# Patient Record
Sex: MALE | Race: WHITE | NOT HISPANIC OR LATINO | Employment: FULL TIME | URBAN - METROPOLITAN AREA
[De-identification: names, ages, dates, MRNs, and addresses within clinical notes are randomized per-mention and may not be internally consistent; named-entity substitution may affect disease eponyms.]

---

## 2017-01-20 ENCOUNTER — APPOINTMENT (OUTPATIENT)
Dept: OCCUPATIONAL MEDICINE | Facility: CLINIC | Age: 57
End: 2017-01-20
Payer: OTHER MISCELLANEOUS

## 2017-01-20 PROCEDURE — 99213 OFFICE O/P EST LOW 20 MIN: CPT

## 2017-01-24 ENCOUNTER — APPOINTMENT (OUTPATIENT)
Dept: OCCUPATIONAL MEDICINE | Facility: CLINIC | Age: 57
End: 2017-01-24
Payer: OTHER MISCELLANEOUS

## 2017-01-24 PROCEDURE — 99213 OFFICE O/P EST LOW 20 MIN: CPT

## 2017-01-26 ENCOUNTER — APPOINTMENT (OUTPATIENT)
Dept: OCCUPATIONAL MEDICINE | Facility: CLINIC | Age: 57
End: 2017-01-26
Payer: OTHER MISCELLANEOUS

## 2017-01-26 PROCEDURE — 99213 OFFICE O/P EST LOW 20 MIN: CPT

## 2018-01-12 NOTE — PROGRESS NOTES
Assessment    1  Benign essential hypertension (401 1) (I10)   2  Anxiety disorder (300 00) (F41 9)   3  Hyperlipidemia (272 4) (E78 5)    Plan  Benign essential hypertension    · (1) CBC/ PLT (NO DIFF); Status:Active; Requested SMR:[id number];    · (1) COMPREHENSIVE METABOLIC PANEL; Status:Active; Requested [id number]:[id number];    · Restrict the salt in your diet by avoiding highly salted foods ; Status:Complete;   Done:  [id number]  Hyperlipidemia    · (1) LIPID PANEL, FASTING; Status:Active; Requested AFQ:[id number];    · (1) TSH; Status:Active; Requested RHF:[id number];    · Continue with our present treatment plan ; Status:Complete;   Done: 46GVO0879   · Eat a low fat and low cholesterol diet ; Status:Complete;   Done: 39RFO9710    Discussion/Summary    In summary then this is a 18-year-old male with hypertension, hyperlipidemia and anxiety disorder  Blood pressure is well-controlled  He states that his anxiety level is acceptable  We will have him get fasting blood work to check his lipids as well as CMP, CBC and TSH  He has had some exertional dyspnea most recently tonight  We did an EKG in the office today which appears normal with the exception of an incomplete right bundle-branch block  He has seen Dr Mary Patel from [facility] in the past but has not seen her for 2 years or more  He states that he had cardiac workup with nuclear stress test in the past which have always been normal  I told him that in light of his hypertension, hyperlipidemia as well as family history of premature coronary disease in his father that he should have cardiac reevaluation  He will contact Dr Mary Patel  In the meantime should he have any accelerated or worrisome symptoms he should seek emergency care  He understands and agrees  The treatment plan was reviewed with the patient/guardian  The patient/guardian understands and agrees with the treatment plan      Chief Complaint  I have had a dry cough   I get SOB wheeling garbage cans up hill  5 mins  for symptoms to pass  I have not seen Dr Idalia Curling in 2 years  History of Present Illness  The patient is being seen for follow-up of generalized anxiety disorder  The patient reports no change in the condition  Interval symptoms:  stable anxiety, denies sleep disruption and denies depression  Medication(s): selective serotonin reuptake inhibitors  Medications:  the patient is adherent to his medication regimen, but he denies medication side effects  Disease management:  the patient is doing well with his goals  The patient states his hyperlipidemia has been stable since the last visit  Comorbid Illnesses: hypertension  Symptoms: denies chest pain  Medications: The patient is not currently on any medications for his hyperlipidemia  The patient presents for follow-up of essential hypertension  The patient states he has been stable with his blood pressure control since the last visit  He has no comorbid illnesses  Interval Events: WHITE with wheeling garbage can uphill  Symptoms: worsened dyspnea, denies chest pain and denies lower extremity edema  Associated symptoms include no headache  Lifestyle: Diet: He does not have a healthy diet  Weight Issues: He has weight concerns  Exercise: He does not exercise regularly  Smoking: He does not use tobacco Alcohol: He denies alcohol use  Drug Use: He denies drug use  Medications: the patient is adherent with his medication regimen  He denies medication side effects  Medication(s): a diuretic and a calcium channel blocker  Disease Management: the patient is doing well with his blood pressure goals  The patient is due for an ECG  Review of Systems    Constitutional: recent 15 lb weight loss  Cardiovascular: as noted in HPI and no chest pain  Respiratory: cough and shortness of breath during exertion, but as noted in HPI, no orthopnea and no PND  Neurological: no headache, no dizziness and no fainting     Psychiatric: as noted in HPI and not suicidal       Active Problems    1  Anxiety disorder (300 00) (F41 9)   2  Benign essential hypertension (401 1) (I10)   3  Hyperlipidemia (272 4) (E78 5)   4  SOB (shortness of breath) on exertion (786 05) (R06 02)    Social History    · Never A Smoker    Current Meds   1  AmLODIPine Besylate 10 MG Oral Tablet; TAKE ONE (1) TABLET(S) DAILY; Therapy: 58PLW3984 to (Evaluate:05Jan2016)  Requested for: 10PRP8672; Last   Rx:[id number] Ordered   2  Citalopram Hydrobromide 20 MG Oral Tablet; TAKE ONE (1) TABLET(S) DAILY AS   DIRECTED; Therapy: 13JGO6962 to (Evaluate:15Mar2016)  Requested for: 95XTK6266; Last   Rx:[id number] Ordered   3  Triamterene-HCTZ 37 5-25 MG Oral Capsule; take 1 capsule daily  Requested for:   04UTC2721; Last Rx:12Shz2879 Ordered    Allergies    1  No Known Drug Allergies    Vitals  Vital Signs [Data Includes: Current Encounter]    Recorded: 20HNM9592 11:36QU   Systolic 169   Diastolic 80   Height 5 ft 9 in   Weight 222 lb 8 0 oz   BMI Calculated 32 86   BSA Calculated 2 16     Physical Exam    Constitutional   General appearance: No acute distress, well appearing and well nourished  Eyes   Conjunctiva and lids: No swelling, erythema, or discharge  Ears, Nose, Mouth, and Throat   Otoscopic examination: Tympanic membrance translucent with normal light reflex  Canals patent without erythema  Oropharynx: Normal with no erythema, edema, exudate or lesions  Pulmonary   Respiratory effort: No increased work of breathing or signs of respiratory distress  Auscultation of lungs: Clear to auscultation, equal breath sounds bilaterally, no wheezes, no rales, no rhonci  Cardiovascular   Auscultation of heart: Normal rate and rhythm, normal S1 and S2, without murmurs  The heart rate was normal  The rhythm was regular  Heart sounds: normal S1, normal S2 and no gallop heard  Examination of extremities for edema and/or varicosities: Normal     Carotid pulses: Normal   No JVD  Lymphatic   Palpation of lymph nodes in neck: No lymphadenopathy  Psychiatric   Orientation to person, place and time: Normal     Mood and affect: Normal          Results/Data  A 12 lead ECG was performed and was normal    Rhythm and rate:  ventricular rate is 75 beats per minute  normal sinus rhythm  P-waves:   the P wave is normal    QRS: incomplete right bundle branch block, but the QRS is normal   ST segment: the ST segments are normal    Comparison to prior ECGs: no prior ECGs were available for comparison        Signatures   Electronically signed by : AMAURY Oro ; Jan 14 2016  8:24PM EST                       (Author)